# Patient Record
Sex: MALE | Race: WHITE | Employment: FULL TIME | URBAN - NONMETROPOLITAN AREA
[De-identification: names, ages, dates, MRNs, and addresses within clinical notes are randomized per-mention and may not be internally consistent; named-entity substitution may affect disease eponyms.]

---

## 2018-03-13 ENCOUNTER — DOCUMENTATION ONLY (OUTPATIENT)
Dept: FAMILY MEDICINE | Facility: OTHER | Age: 38
End: 2018-03-13

## 2018-03-13 PROBLEM — R53.81 MALAISE AND FATIGUE: Status: ACTIVE | Noted: 2018-03-13

## 2018-03-13 PROBLEM — R53.83 MALAISE AND FATIGUE: Status: ACTIVE | Noted: 2018-03-13

## 2018-03-13 PROBLEM — J01.90 ACUTE SINUSITIS: Status: ACTIVE | Noted: 2018-03-13

## 2018-03-13 PROBLEM — S61.209A OPEN WOUND OF FINGER WITHOUT COMPLICATION: Status: ACTIVE | Noted: 2018-03-13

## 2018-03-13 RX ORDER — MULTIVIT WITH MINERALS/LUTEIN
1000 TABLET ORAL DAILY
COMMUNITY
Start: 2015-11-03

## 2020-12-30 ENCOUNTER — ANCILLARY PROCEDURE (OUTPATIENT)
Dept: GENERAL RADIOLOGY | Facility: CLINIC | Age: 40
End: 2020-12-30
Attending: PHYSICIAN ASSISTANT
Payer: COMMERCIAL

## 2020-12-30 ENCOUNTER — OFFICE VISIT (OUTPATIENT)
Dept: URGENT CARE | Facility: URGENT CARE | Age: 40
End: 2020-12-30
Payer: COMMERCIAL

## 2020-12-30 VITALS
OXYGEN SATURATION: 100 % | SYSTOLIC BLOOD PRESSURE: 121 MMHG | HEIGHT: 72 IN | TEMPERATURE: 98.2 F | HEART RATE: 59 BPM | DIASTOLIC BLOOD PRESSURE: 81 MMHG | WEIGHT: 180 LBS | BODY MASS INDEX: 24.38 KG/M2
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
## 2020-12-30 DIAGNOSIS — S29.9XXA TRAUMATIC INJURY OF RIB: ICD-10-CM

## 2020-12-30 PROCEDURE — 71110 X-RAY EXAM RIBS BIL 3 VIEWS: CPT | Performed by: INTERNAL MEDICINE

## 2020-12-30 PROCEDURE — 99214 OFFICE O/P EST MOD 30 MIN: CPT | Performed by: PHYSICIAN ASSISTANT

## 2020-12-30 SDOH — HEALTH STABILITY: MENTAL HEALTH: HOW MANY STANDARD DRINKS CONTAINING ALCOHOL DO YOU HAVE ON A TYPICAL DAY?: NOT ASKED

## 2020-12-30 SDOH — HEALTH STABILITY: MENTAL HEALTH: HOW OFTEN DO YOU HAVE A DRINK CONTAINING ALCOHOL?: NOT ASKED

## 2020-12-30 SDOH — HEALTH STABILITY: MENTAL HEALTH: HOW OFTEN DO YOU HAVE 6 OR MORE DRINKS ON ONE OCCASION?: NOT ASKED

## 2020-12-30 ASSESSMENT — PAIN SCALES - GENERAL: PAINLEVEL: SEVERE PAIN (6)

## 2020-12-30 ASSESSMENT — MIFFLIN-ST. JEOR: SCORE: 1769.47

## 2020-12-31 NOTE — PATIENT INSTRUCTIONS
Ibuprofen 600 mg (3 of the 200 mg OTC tablets or 600 mg of the children's liquid) up to 4 times daily with food or milk  Tylenol 1000 mg every 8 hours as needed    Be sure to take some deep breaths through out the day for the next few days to week    Patient Education     Rib Contusion or Minor Fracture    A rib contusion is a bruise to one or more rib bones. It may cause pain, tenderness, swelling, and a purplish color to the skin. There may be a sharp pain with each breath. A rib contusion takes anywhere from a few days to a few weeks to heal. A minor rib fracture or break may cause the same symptoms as a rib contusion. The small crack may not be seen on a regular chest X-ray. Treatment for both problems is basically the same.  Home care    You may use over-the-counter pain medicine to control pain, unless another pain medicine was prescribed. If you have chronic liver or kidney disease or ever had a stomach ulcer or GI (gastrointestinal) bleeding, talk with your healthcare provider before using these medicines.    Rest. Don't lift anything heavy or do any activity that causes pain.    Apply an ice pack over the injured area for 15 to 20 minutes every 1 to 2 hours. You should do this for the first 24 to 48 hours. To make an ice pack, put ice cubes in a plastic bag that seals at the top. Wrap the bag in a clean, thin towel or cloth. Never put ice or an ice pack directly on the skin. Continue with ice packs as needed for the relief of pain and swelling.    The first 3 to 4 weeks of healing will be the most painful. If your pain is not under control with the treatment given, call your healthcare provider. Sometimes a stronger pain medicine may be needed. A nerve block can be done in case of severe pain. It will numb the nerve between the ribs.  Follow-up care  Follow up with your healthcare provider, or as advised.  If X-rays were taken, you will be told of any new findings that may affect your care.  Call 911  Call  911 if you have:    Dizziness, weakness or fainting    Shortness of breath with or without chest discomfort    New or worsening pain  When to seek medical advice  Call your healthcare provider right away if any of these occur:    Fever of 100.4 F (38 C) or higher, or as directed by your healthcare provider    Chills    Stomach pain, vomiting  [employer] last reviewed this educational content on 6/1/2018 2000-2020 [employer]. [address] [place] [address], [place] [id number]. All rights reserved. This information is not intended as a substitute for professional medical care. Always follow your healthcare professional's instructions.

## 2020-12-31 NOTE — PROGRESS NOTES
Chief Complaint   Patient presents with     Fall     Skiing fall this morning. Chest and rib pain, left shoulder pain.        ASSESSMENT/PLAN:  Geoff was seen today for fall.    Diagnoses and all orders for this visit:    Fall, initial encounter  -     XR Ribs Bilateral 3 Views    Traumatic injury of rib      After falling today patient sustained musculoskeletal injury.  X-ray showed no acute fracture.  Will treat conservatively with rest, ice, compression and elevation.  Discussed pulmonary toilet to help prevent pneumonia.  Follow-up if not improving in the next 7 days as sometimes fractures take time to present.  Can use Tylenol ibuprofen as needed for pain.    The plan of care was discussed with the patient. They understand and agree with the course of treatment prescribed. A printed summary was given including instructions and medications.    SUBJECTIVE:  Geoff is a 40 year old male who presents to urgent care after falling while skiing earlier today.  Patient states he had a rock flipped forward landing straight on his chest.  He did not seem to brace the fall with anything else.  He had pain in the anterior aspect of the chest initially that has continued to worsen throughout the day.  He also noticed some soreness on the left shoulder.  He denies any weakness or paresthesias.  Patient did not hit his head.  He denies any shortness of breath, wheeze, cough.  Denies any nausea, vomiting, diarrhea or abdominal pain besides some soreness.  He denies any blood in the stool or urine.  Patient is able to take deep breaths and it somewhat uncomfortable but no significant pain     ROS: 7 point ROS neg other than the symptoms noted above in the HPI.     Current Outpatient Medications   Medication     ascorbic acid (VITAMIN C) 1000 MG TABS     Cholecalciferol (VITAMIN D3) 1000 UNITS CAPS     No current facility-administered medications for this visit.       Patient Active Problem List   Diagnosis     Acute sinusitis      Malaise and fatigue     Open wound of finger without complication        No past medical history on file.  No past surgical history on file.  No family history on file.  Social History     Tobacco Use     Smoking status: Light Tobacco Smoker     Types: Cigars     Smokeless tobacco: Current User     Types: Chew     Tobacco comment: occasional chew/cigars   Substance Use Topics     Alcohol use: Yes        OBJECTIVE:  /81 (BP Location: Left arm, Cuff Size: Adult Large)   Pulse 59   Temp 98.2  F (36.8  C) (Temporal)   Ht 1.829 m (6')   Wt 81.6 kg (180 lb)   SpO2 100%   BMI 24.41 kg/m       GENERAL APPEARANCE: healthy, alert and no distress    HENT: NCAT  EYES: EOMI, Conjunctiva clear     RESP: No respiratory distress, lungs clear to auscultation - no wheezes, rales, rhonchi or crackles     ABDOMEN:  soft, nontender, no guarding or rebound, no masses appreciated     MSK: No gross deformities noted, FROM in all extremities.  Pain with terminal abduction and flexion of the left shoulder with no significant point bony tenderness.  Diffuse tenderness over distal costosternal border on the left and right side which more significant tenderness noted on the anterior sixth  through eighth ribs towards the costophrenic border.  No crepitus or significant step-off noted     SKIN: no suspicious lesions or rashes appreciated on exposed skin     NEURO: AOx3, Normal strength and tone, sensory exam grossly normal, no focal deficit      PSYCH: mentation appears normal. and affect normal/bright    DIAGNOSTICS:  X-ray chest and bilateral ribs personal interpretation: No acute bony abnormality, no fracture noted.  No pneumothorax  No results found for any visits on 12/30/20.     Ramirez Randall PA-C    The use of [employer] dictation services may have been used to construct the content in this note; any grammatical or spelling errors are non-intentional. Please contact the author of this note directly if you are in need  of any clarification.

## 2021-05-15 ENCOUNTER — HEALTH MAINTENANCE LETTER (OUTPATIENT)
Age: 41
End: 2021-05-15

## 2021-09-04 ENCOUNTER — HEALTH MAINTENANCE LETTER (OUTPATIENT)
Age: 41
End: 2021-09-04

## 2022-06-11 ENCOUNTER — HEALTH MAINTENANCE LETTER (OUTPATIENT)
Age: 42
End: 2022-06-11

## 2022-10-16 ENCOUNTER — HEALTH MAINTENANCE LETTER (OUTPATIENT)
Age: 42
End: 2022-10-16

## 2023-06-17 ENCOUNTER — HEALTH MAINTENANCE LETTER (OUTPATIENT)
Age: 43
End: 2023-06-17